# Patient Record
Sex: FEMALE | Race: WHITE | NOT HISPANIC OR LATINO | Employment: OTHER | URBAN - NONMETROPOLITAN AREA
[De-identification: names, ages, dates, MRNs, and addresses within clinical notes are randomized per-mention and may not be internally consistent; named-entity substitution may affect disease eponyms.]

---

## 2018-08-11 PROBLEM — R55 SYNCOPE AND COLLAPSE: Status: ACTIVE | Noted: 2018-08-11

## 2018-08-13 PROBLEM — R63.4 WEIGHT LOSS, UNINTENTIONAL: Status: ACTIVE | Noted: 2018-08-13

## 2018-08-14 PROBLEM — R42 VERTIGO: Status: ACTIVE | Noted: 2018-08-14

## 2022-05-10 DIAGNOSIS — Z12.31 ENCOUNTER FOR SCREENING MAMMOGRAM FOR MALIGNANT NEOPLASM OF BREAST: Primary | ICD-10-CM

## 2022-05-10 DIAGNOSIS — Z78.0 POST-MENOPAUSAL: ICD-10-CM

## 2022-06-29 ENCOUNTER — HOSPITAL ENCOUNTER (EMERGENCY)
Facility: HOSPITAL | Age: 72
Discharge: HOME | End: 2022-06-29
Attending: EMERGENCY MEDICINE
Payer: MEDICARE

## 2022-06-29 VITALS
DIASTOLIC BLOOD PRESSURE: 60 MMHG | WEIGHT: 100 LBS | HEIGHT: 62 IN | BODY MASS INDEX: 18.4 KG/M2 | RESPIRATION RATE: 16 BRPM | TEMPERATURE: 98 F | HEART RATE: 64 BPM | SYSTOLIC BLOOD PRESSURE: 131 MMHG | OXYGEN SATURATION: 99 %

## 2022-06-29 DIAGNOSIS — R42 DIZZINESS: Primary | ICD-10-CM

## 2022-06-29 LAB
ALBUMIN SERPL BCP-MCNC: 3.5 G/DL (ref 3.5–5.2)
ALP SERPL-CCNC: 92 U/L (ref 55–135)
ALT SERPL W/O P-5'-P-CCNC: 10 U/L (ref 10–44)
ANION GAP SERPL CALC-SCNC: 5 MMOL/L (ref 8–16)
AST SERPL-CCNC: 14 U/L (ref 10–40)
BASOPHILS # BLD AUTO: 0.07 K/UL (ref 0–0.2)
BASOPHILS NFR BLD: 1.4 % (ref 0–1.9)
BILIRUB SERPL-MCNC: 0.8 MG/DL (ref 0.1–1)
BUN SERPL-MCNC: 8 MG/DL (ref 8–23)
CALCIUM SERPL-MCNC: 8.6 MG/DL (ref 8.7–10.5)
CHLORIDE SERPL-SCNC: 106 MMOL/L (ref 95–110)
CO2 SERPL-SCNC: 29 MMOL/L (ref 23–29)
CREAT SERPL-MCNC: 0.8 MG/DL (ref 0.5–1.4)
DIFFERENTIAL METHOD: ABNORMAL
EOSINOPHIL # BLD AUTO: 0.1 K/UL (ref 0–0.5)
EOSINOPHIL NFR BLD: 1.2 % (ref 0–8)
ERYTHROCYTE [DISTWIDTH] IN BLOOD BY AUTOMATED COUNT: 13.6 % (ref 11.5–14.5)
EST. GFR  (AFRICAN AMERICAN): >60 ML/MIN/1.73 M^2
EST. GFR  (NON AFRICAN AMERICAN): >60 ML/MIN/1.73 M^2
GLUCOSE SERPL-MCNC: 102 MG/DL (ref 70–110)
HCT VFR BLD AUTO: 45.9 % (ref 37–48.5)
HGB BLD-MCNC: 15.6 G/DL (ref 12–16)
IMM GRANULOCYTES # BLD AUTO: 0.02 K/UL (ref 0–0.04)
IMM GRANULOCYTES NFR BLD AUTO: 0.4 % (ref 0–0.5)
LYMPHOCYTES # BLD AUTO: 2.3 K/UL (ref 1–4.8)
LYMPHOCYTES NFR BLD: 47.6 % (ref 18–48)
MAGNESIUM SERPL-MCNC: 2.5 MG/DL (ref 1.6–2.6)
MCH RBC QN AUTO: 32.2 PG (ref 27–31)
MCHC RBC AUTO-ENTMCNC: 34 G/DL (ref 32–36)
MCV RBC AUTO: 95 FL (ref 82–98)
MONOCYTES # BLD AUTO: 0.4 K/UL (ref 0.3–1)
MONOCYTES NFR BLD: 8.6 % (ref 4–15)
NEUTROPHILS # BLD AUTO: 2 K/UL (ref 1.8–7.7)
NEUTROPHILS NFR BLD: 40.8 % (ref 38–73)
NRBC BLD-RTO: 0 /100 WBC
PLATELET # BLD AUTO: 236 K/UL (ref 150–450)
PMV BLD AUTO: 11 FL (ref 9.2–12.9)
POTASSIUM SERPL-SCNC: 3.8 MMOL/L (ref 3.5–5.1)
PROT SERPL-MCNC: 7 G/DL (ref 6–8.4)
RBC # BLD AUTO: 4.84 M/UL (ref 4–5.4)
SODIUM SERPL-SCNC: 140 MMOL/L (ref 136–145)
WBC # BLD AUTO: 4.9 K/UL (ref 3.9–12.7)

## 2022-06-29 PROCEDURE — 99283 EMERGENCY DEPT VISIT LOW MDM: CPT

## 2022-06-29 PROCEDURE — 80053 COMPREHEN METABOLIC PANEL: CPT | Performed by: EMERGENCY MEDICINE

## 2022-06-29 PROCEDURE — 85025 COMPLETE CBC W/AUTO DIFF WBC: CPT | Performed by: EMERGENCY MEDICINE

## 2022-06-29 PROCEDURE — 83735 ASSAY OF MAGNESIUM: CPT | Performed by: EMERGENCY MEDICINE

## 2022-06-29 RX ORDER — MECLIZINE HYDROCHLORIDE 25 MG/1
25 TABLET ORAL
Status: DISCONTINUED | OUTPATIENT
Start: 2022-06-29 | End: 2022-06-29 | Stop reason: HOSPADM

## 2022-06-29 NOTE — ED PROVIDER NOTES
EMERGENCY DEPARTMENT HISTORY AND PHYSICAL EXAM          Date: 6/29/2022   Patient Name: Pricila Smith       History of Presenting Illness           Chief Complaint   Patient presents with    Dizziness     Pt began feeling dizzy this morning. Pt family took her to another ER. Pt was discharged and Dx with A-fib. Pt was then taken to PCP and had elevated B/P and was instructed to come to the ER.          History Provided By: Patient and Family Member    [id number]   Pricila Smith is a 72 y.o. female with PMHX of hypertension on propanolol, anxiety on Paxil and Xanax as needed, hyperlipidemia who presents to the emergency department C/O dizziness.    Patient reports general malaise x1 week.  Reports feeling dizzy which she describes as room spinning, fatigue, nausea, and numbness and tingling in extremities. Patient was seen at [facility] today for these symptoms. Had a negative head CT, labs, and UA and was discharged. She was noted to be in a-fib.     Patient went to PCP and was told her BP was too high and to go back to ER.    She reports       PCP: [facility]        Current Facility-Administered Medications   Medication Dose Route Frequency Provider Last Rate Last Admin    meclizine tablet 25 mg  25 mg Oral ED 1 Time Eliseo Whitehead MD         Current Outpatient Medications   Medication Sig Dispense Refill    ALPRAZolam (XANAX) 0.25 MG tablet Take 0.25 mg by mouth daily as needed for Anxiety.      meclizine (ANTIVERT) 25 mg tablet Take 1 tablet (25 mg total) by mouth 3 (three) times daily as needed for Dizziness. 60 tablet 0    paroxetine (PAXIL) 20 MG tablet Take 20 mg by mouth every morning.      propranolol (INDERAL LA) 120 MG 24 hr capsule Take 120 mg by mouth once daily.             Past History     Past Medical History:   Past Medical History:   Diagnosis Date    Hypercholesteremia     Hypertension         Past Surgical History:   Past Surgical History:   Procedure Laterality  "Date    HYSTERECTOMY          Family History:   No family history on file.     Social History:   Social History     Tobacco Use    Smoking status: Heavy Tobacco Smoker     Packs/day: 0.50    Smokeless tobacco: Never Used   Substance Use Topics    Alcohol use: No        Allergies:   Review of patient's allergies indicates:   Allergen Reactions    Codeine      Nausea vomiting and diarrhea          Review of Systems   Review of Systems   Constitutional: Positive for activity change and fatigue.   Gastrointestinal: Positive for nausea. Negative for abdominal pain and vomiting.   Neurological: Positive for dizziness and numbness.   All other systems reviewed and are negative.               Physical Exam     Vitals:    06/29/22 1439 06/29/22 1525 06/29/22 1556 06/29/22 1620   BP: (!) 198/74 (!) 184/79 (!) 155/72 131/60   BP Location: Right arm      Patient Position: Sitting      Pulse: 81 67 61 64   Resp: 16 18 18 16   Temp: 97.6 °F (36.4 °C)      TempSrc: Oral      SpO2: 97% 98% 98% 99%   Weight: 45.4 kg (100 lb)      Height: 5' 2" (1.575 m)         Physical Exam  Vitals and nursing note reviewed.   Constitutional:       General: She is not in acute distress.     Appearance: Normal appearance. She is not ill-appearing.   HENT:      Head: Normocephalic and atraumatic.      Right Ear: External ear normal.      Left Ear: External ear normal.      Nose: Nose normal. No congestion or rhinorrhea.      Mouth/Throat:      Mouth: Mucous membranes are moist.   Eyes:      Conjunctiva/sclera: Conjunctivae normal.      Pupils: Pupils are equal, round, and reactive to light.   Cardiovascular:      Rate and Rhythm: Normal rate and regular rhythm.      Pulses: Normal pulses.      Heart sounds: Murmur heard.    Systolic murmur is present with a grade of 2/6.  Pulmonary:      Effort: Pulmonary effort is normal. No respiratory distress.      Breath sounds: Normal breath sounds.   Abdominal:      General: Bowel sounds are normal. There " is no distension.      Palpations: Abdomen is soft.      Tenderness: There is no abdominal tenderness. There is no guarding or rebound.   Musculoskeletal:         General: No deformity. Normal range of motion.      Cervical back: Normal range of motion. No rigidity.   Skin:     General: Skin is dry.   Neurological:      General: No focal deficit present.      Mental Status: She is alert and oriented to person, place, and time. Mental status is at baseline.      Cranial Nerves: Cranial nerves 2-12 are intact.      Sensory: Sensation is intact.      Motor: Motor function is intact. No weakness, tremor or abnormal muscle tone.      Coordination: Coordination is intact. Coordination normal. Finger-Nose-Finger Test normal. Rapid alternating movements normal.   Psychiatric:         Mood and Affect: Mood normal.         Speech: Speech normal.         Behavior: Behavior normal. Behavior is cooperative.              Diagnostic Study Results      Labs -   Recent Results (from the past 12 hour(s))   CBC auto differential    Collection Time: 06/29/22  3:35 PM   Result Value Ref Range    WBC 4.90 3.90 - 12.70 K/uL    RBC 4.84 4.00 - 5.40 M/uL    Hemoglobin 15.6 12.0 - 16.0 g/dL    Hematocrit 45.9 37.0 - 48.5 %    MCV 95 82 - 98 fL    MCH 32.2 (H) 27.0 - 31.0 pg    MCHC 34.0 32.0 - 36.0 g/dL    RDW 13.6 11.5 - 14.5 %    Platelets 236 150 - 450 K/uL    MPV 11.0 9.2 - 12.9 fL    Immature Granulocytes 0.4 0.0 - 0.5 %    Gran # (ANC) 2.0 1.8 - 7.7 K/uL    Immature Grans (Abs) 0.02 0.00 - 0.04 K/uL    Lymph # 2.3 1.0 - 4.8 K/uL    Mono # 0.4 0.3 - 1.0 K/uL    Eos # 0.1 0.0 - 0.5 K/uL    Baso # 0.07 0.00 - 0.20 K/uL    nRBC 0 0 /100 WBC    Gran % 40.8 38.0 - 73.0 %    Lymph % 47.6 18.0 - 48.0 %    Mono % 8.6 4.0 - 15.0 %    Eosinophil % 1.2 0.0 - 8.0 %    Basophil % 1.4 0.0 - 1.9 %    Differential Method Automated    Comprehensive metabolic panel    Collection Time: 06/29/22  3:35 PM   Result Value Ref Range    Sodium 140 136 - 145  mmol/L    Potassium 3.8 3.5 - 5.1 mmol/L    Chloride 106 95 - 110 mmol/L    CO2 29 23 - 29 mmol/L    Glucose 102 70 - 110 mg/dL    BUN 8 8 - 23 mg/dL    Creatinine 0.8 0.5 - 1.4 mg/dL    Calcium 8.6 (L) 8.7 - 10.5 mg/dL    Total Protein 7.0 6.0 - 8.4 g/dL    Albumin 3.5 3.5 - 5.2 g/dL    Total Bilirubin 0.8 0.1 - 1.0 mg/dL    Alkaline Phosphatase 92 55 - 135 U/L    AST 14 10 - 40 U/L    ALT 10 10 - 44 U/L    Anion Gap 5 (L) 8 - 16 mmol/L    eGFR if African American >60.0 >60 mL/min/1.73 m^2    eGFR if non African American >60.0 >60 mL/min/1.73 m^2   Magnesium    Collection Time: 06/29/22  3:35 PM   Result Value Ref Range    Magnesium 2.5 1.6 - 2.6 mg/dL        Radiologic Studies -    No orders to display        Medications given in the ED-   Medications   meclizine tablet 25 mg (25 mg Oral Not Given 6/29/22 1530)           Medical Decision Making    I am the first provider for this patient.     I reviewed the vital signs, available nursing notes, past medical history, past surgical history, family history and social history.     Vital Signs:  Reviewed the patient's vital signs.     Pulse Oximetry Analysis and Interpretation:    98% on Room Air, normal      EKG interpretation: (Preliminary)   Interpreted by Eliseo Whitehead MD at 1538   Appears to be normal sinus rhythm rate of 67 with significant artifacts     EKG interpretation: (Preliminary)   Interpreted by Eliseo Whitehead MD at 1538   Normal sinus rhythm rate of 71, normal intervals, normal axis, normal EKG     Records Reviewed: Old medical records.  Nursing notes.        Provider Notes (Medical Decision Making): Pricila Smith is a 72 y.o. female here with general malaise and dizziness x1 week    Patient hypertensive on arrival with time patient is now normotensive.  Observed on monitor patient remained in normal sinus rhythm.  She had an EKG read as AFib by the computer however I believe this is just picking up artifact.  It is possible patient is having  paroxysmal AFib and I have instructed her to follow up with her cardiologist, Dr. Cabrera.    Her labs are benign.  Electrolytes unremarkable.  She is not having any sort of ACS or anginal symptoms.  Her neuro exam is nonfocal normal.  Doubt central cause of her dizziness or vertigo.  She had a negative head CT outside hospital today.    I have instructed patient to follow-up with her primary care provider for further evaluation of her symptoms.  Reasons to return to ER discussed and patient under strict return precautions for any symptoms of AFib RVR, syncope, chest pain, or shortness of breath.        Procedures:   Procedures      ED Course:               Diagnosis and Disposition     Critical Care:      DISCHARGE NOTE:       Pricila Smith's  results have been reviewed with her.  She has been counseled regarding her diagnosis, treatment, and plan.  She verbally conveys understanding and agreement of the signs, symptoms, diagnosis, treatment and prognosis and additionally agrees to follow up as discussed.  She also agrees with the care-plan and conveys that all of her questions have been answered.  I have also provided discharge instructions for her that include: educational information regarding their diagnosis and treatment, and list of reasons why they would want to return to the ED prior to their follow-up appointment, should her condition change. She has been provided with education for proper emergency department utilization.         CLINICAL IMPRESSION:         1. Dizziness              PLAN:   1. Discharge Home  2.      Medication List      ASK your doctor about these medications    ALPRAZolam 0.25 MG tablet  Commonly known as: XANAX     meclizine 25 mg tablet  Commonly known as: ANTIVERT  Take 1 tablet (25 mg total) by mouth 3 (three) times daily as needed for Dizziness.     paroxetine 20 MG tablet  Commonly known as: PAXIL     propranoloL 120 MG 24 hr capsule  Commonly known as: INDERAL LA           3.  [facility]  [address] [facility] [address]  [phone]    Schedule an appointment as soon as possible for a visit   Primary care follow up    Addi Cabrera MD  [address] [facility] [address]  [facility] [address]  [phone]             _______________________________     Please note that this dictation was completed with [employer], the computer voice recognition software.  Quite often unanticipated grammatical, syntax, homophones, and other interpretive errors are inadvertently transcribed by the computer software.  Please disregard these errors.  Please excuse any errors that have escaped final proofreading.             Eliseo Whitehead MD  06/29/22 8615

## 2023-01-12 ENCOUNTER — OFFICE VISIT (OUTPATIENT)
Dept: PRIMARY CARE CLINIC | Facility: CLINIC | Age: 73
End: 2023-01-12
Payer: MEDICARE

## 2023-01-12 VITALS
OXYGEN SATURATION: 99 % | BODY MASS INDEX: 18.03 KG/M2 | HEART RATE: 68 BPM | WEIGHT: 98 LBS | DIASTOLIC BLOOD PRESSURE: 93 MMHG | SYSTOLIC BLOOD PRESSURE: 209 MMHG | RESPIRATION RATE: 14 BRPM | HEIGHT: 62 IN | TEMPERATURE: 98 F

## 2023-01-12 DIAGNOSIS — R63.4 WEIGHT LOSS, UNINTENTIONAL: ICD-10-CM

## 2023-01-12 DIAGNOSIS — I10 PRIMARY HYPERTENSION: ICD-10-CM

## 2023-01-12 DIAGNOSIS — R22.42 LOCALIZED SWELLING OF LEFT FOOT: ICD-10-CM

## 2023-01-12 DIAGNOSIS — E78.2 MIXED HYPERLIPIDEMIA: Primary | ICD-10-CM

## 2023-01-12 DIAGNOSIS — Z11.59 ENCOUNTER FOR HEPATITIS C SCREENING TEST FOR LOW RISK PATIENT: ICD-10-CM

## 2023-01-12 LAB
ALBUMIN SERPL BCP-MCNC: 3.6 G/DL (ref 3.5–5.2)
ALP SERPL-CCNC: 106 U/L (ref 55–135)
ALT SERPL W/O P-5'-P-CCNC: 13 U/L (ref 10–44)
ANION GAP SERPL CALC-SCNC: 4 MMOL/L (ref 8–16)
AST SERPL-CCNC: 13 U/L (ref 10–40)
BASOPHILS # BLD AUTO: 0.08 K/UL (ref 0–0.2)
BASOPHILS NFR BLD: 1.5 % (ref 0–1.9)
BILIRUB SERPL-MCNC: 0.6 MG/DL (ref 0.1–1)
BUN SERPL-MCNC: 13 MG/DL (ref 8–23)
CALCIUM SERPL-MCNC: 9.3 MG/DL (ref 8.7–10.5)
CHLORIDE SERPL-SCNC: 108 MMOL/L (ref 95–110)
CHOLEST SERPL-MCNC: 191 MG/DL (ref 120–199)
CHOLEST/HDLC SERPL: 3.8 {RATIO} (ref 2–5)
CO2 SERPL-SCNC: 28 MMOL/L (ref 23–29)
CREAT SERPL-MCNC: 0.9 MG/DL (ref 0.5–1.4)
DIFFERENTIAL METHOD: ABNORMAL
EOSINOPHIL # BLD AUTO: 0 K/UL (ref 0–0.5)
EOSINOPHIL NFR BLD: 0.7 % (ref 0–8)
ERYTHROCYTE [DISTWIDTH] IN BLOOD BY AUTOMATED COUNT: 13.1 % (ref 11.5–14.5)
EST. GFR  (NO RACE VARIABLE): >60 ML/MIN/1.73 M^2
GLUCOSE SERPL-MCNC: 102 MG/DL (ref 70–110)
HCT VFR BLD AUTO: 46.6 % (ref 37–48.5)
HDLC SERPL-MCNC: 50 MG/DL (ref 40–75)
HDLC SERPL: 26.2 % (ref 20–50)
HGB BLD-MCNC: 15.8 G/DL (ref 12–16)
IMM GRANULOCYTES # BLD AUTO: 0.01 K/UL (ref 0–0.04)
IMM GRANULOCYTES NFR BLD AUTO: 0.2 % (ref 0–0.5)
LDLC SERPL CALC-MCNC: 126.8 MG/DL (ref 63–159)
LYMPHOCYTES # BLD AUTO: 2.3 K/UL (ref 1–4.8)
LYMPHOCYTES NFR BLD: 43 % (ref 18–48)
MCH RBC QN AUTO: 33.4 PG (ref 27–31)
MCHC RBC AUTO-ENTMCNC: 33.9 G/DL (ref 32–36)
MCV RBC AUTO: 99 FL (ref 82–98)
MONOCYTES # BLD AUTO: 0.5 K/UL (ref 0.3–1)
MONOCYTES NFR BLD: 9.6 % (ref 4–15)
NEUTROPHILS # BLD AUTO: 2.4 K/UL (ref 1.8–7.7)
NEUTROPHILS NFR BLD: 45 % (ref 38–73)
NONHDLC SERPL-MCNC: 141 MG/DL
NRBC BLD-RTO: 0 /100 WBC
PLATELET # BLD AUTO: 246 K/UL (ref 150–450)
PMV BLD AUTO: 12 FL (ref 9.2–12.9)
POTASSIUM SERPL-SCNC: 4.3 MMOL/L (ref 3.5–5.1)
PROT SERPL-MCNC: 7.5 G/DL (ref 6–8.4)
RBC # BLD AUTO: 4.73 M/UL (ref 4–5.4)
SODIUM SERPL-SCNC: 140 MMOL/L (ref 136–145)
TRIGL SERPL-MCNC: 71 MG/DL (ref 30–150)
WBC # BLD AUTO: 5.42 K/UL (ref 3.9–12.7)

## 2023-01-12 PROCEDURE — 80061 LIPID PANEL: CPT | Performed by: STUDENT IN AN ORGANIZED HEALTH CARE EDUCATION/TRAINING PROGRAM

## 2023-01-12 PROCEDURE — 99999 PR PBB SHADOW E&M-EST. PATIENT-LVL IV: ICD-10-PCS | Mod: PBBFAC,,, | Performed by: STUDENT IN AN ORGANIZED HEALTH CARE EDUCATION/TRAINING PROGRAM

## 2023-01-12 PROCEDURE — 85025 COMPLETE CBC W/AUTO DIFF WBC: CPT | Performed by: STUDENT IN AN ORGANIZED HEALTH CARE EDUCATION/TRAINING PROGRAM

## 2023-01-12 PROCEDURE — 99999 PR PBB SHADOW E&M-EST. PATIENT-LVL IV: CPT | Mod: PBBFAC,,, | Performed by: STUDENT IN AN ORGANIZED HEALTH CARE EDUCATION/TRAINING PROGRAM

## 2023-01-12 PROCEDURE — 86803 HEPATITIS C AB TEST: CPT | Performed by: STUDENT IN AN ORGANIZED HEALTH CARE EDUCATION/TRAINING PROGRAM

## 2023-01-12 PROCEDURE — 80053 COMPREHEN METABOLIC PANEL: CPT | Performed by: STUDENT IN AN ORGANIZED HEALTH CARE EDUCATION/TRAINING PROGRAM

## 2023-01-12 PROCEDURE — 99214 OFFICE O/P EST MOD 30 MIN: CPT | Mod: PBBFAC,PN | Performed by: STUDENT IN AN ORGANIZED HEALTH CARE EDUCATION/TRAINING PROGRAM

## 2023-01-12 PROCEDURE — 99203 PR OFFICE/OUTPT VISIT, NEW, LEVL III, 30-44 MIN: ICD-10-PCS | Mod: S$PBB,,, | Performed by: STUDENT IN AN ORGANIZED HEALTH CARE EDUCATION/TRAINING PROGRAM

## 2023-01-12 PROCEDURE — 99203 OFFICE O/P NEW LOW 30 MIN: CPT | Mod: S$PBB,,, | Performed by: STUDENT IN AN ORGANIZED HEALTH CARE EDUCATION/TRAINING PROGRAM

## 2023-01-12 RX ORDER — AMLODIPINE BESYLATE 5 MG/1
5 TABLET ORAL DAILY
Qty: 30 TABLET | Refills: 11 | Status: SHIPPED | OUTPATIENT
Start: 2023-01-12 | End: 2023-01-26

## 2023-01-12 RX ORDER — EZETIMIBE 10 MG/1
10 TABLET ORAL
COMMUNITY
Start: 2022-08-18 | End: 2023-01-12 | Stop reason: SDUPTHER

## 2023-01-12 RX ORDER — ASPIRIN 81 MG/1
81 TABLET ORAL DAILY
COMMUNITY

## 2023-01-12 RX ORDER — EZETIMIBE 10 MG/1
10 TABLET ORAL DAILY
Qty: 90 TABLET | Refills: 0 | Status: SHIPPED | OUTPATIENT
Start: 2023-01-12 | End: 2023-03-24

## 2023-01-12 NOTE — PROGRESS NOTES
[facility] Note    HPI:  Pricila Smith is a 72 y.o. female who presents today for No chief complaint on file.    Denies fever, chills, headaches, vision changes, weight changes, appetite changes, fatigue, chest pain, palpitations, shortness of breath, abdominal pain, nausea, vomiting, constipation, dysuria, diarrhea.     ROS   A review of systems was performed and was negative except as noted above.    I personally reviewed allergies, past medical, surgical, social and family history and updated as appropriate.    Medications:    Current Outpatient Medications:     ALPRAZolam (XANAX) 0.25 MG tablet, Take 0.25 mg by mouth daily as needed for Anxiety., Disp: , Rfl:     aspirin 81 mg Cap, 1 tablet, Disp: , Rfl:     paroxetine (PAXIL) 20 MG tablet, Take 20 mg by mouth every morning., Disp: , Rfl:     propranolol (INDERAL LA) 120 MG 24 hr capsule, Take 120 mg by mouth once daily., Disp: , Rfl:     amLODIPine (NORVASC) 5 MG tablet, Take 1 tablet (5 mg total) by mouth once daily., Disp: 30 tablet, Rfl: 11    ezetimibe (ZETIA) 10 mg tablet, Take 1 tablet (10 mg total) by mouth once daily., Disp: 90 tablet, Rfl: 0     Health Maintenance:    There is no immunization history on file for this patient.   Health Maintenance   Topic Date Due    Hepatitis C Screening  Never done    Lipid Panel  Never done    TETANUS VACCINE  Never done    DEXA Scan  Never done    Mammogram  11/13/2016     The patient has no Health Maintenance topics of status Not Due  Health Maintenance Due   Topic Date Due    Hepatitis C Screening  Never done    Lipid Panel  Never done    TETANUS VACCINE  Never done    DEXA Scan  Never done    Colorectal Cancer Screening  Never done    Mammogram  11/13/2016       PHYSICAL EXAM:  Vitals:    01/12/23 0944 01/12/23 1011 01/12/23 1012   BP: (!) 197/91 (!) 207/96 (!) 209/93   BP Location: Right arm Right arm Left arm   Patient Position: Sitting Sitting    BP Method: Small (Automatic)     Pulse: 68 67  "68   Resp: 14     Temp: 98.1 °F (36.7 °C)     TempSrc: Oral     SpO2: 99%     Weight: 44.5 kg (98 lb)     Height: 5' 2" (1.575 m)       Body mass index is 17.92 kg/m².  Physical Exam  Vitals reviewed.   Constitutional:       General: She is not in acute distress.     Appearance: Normal appearance. She is not ill-appearing or toxic-appearing.      Comments: thin   HENT:      Head: Normocephalic and atraumatic.      Right Ear: External ear normal.      Left Ear: External ear normal.      Nose: Nose normal.      Mouth/Throat:      Mouth: Mucous membranes are moist.      Pharynx: Oropharynx is clear.   Eyes:      Extraocular Movements: Extraocular movements intact.      Conjunctiva/sclera: Conjunctivae normal.   Cardiovascular:      Rate and Rhythm: Normal rate and regular rhythm.      Pulses: Normal pulses.      Heart sounds: Normal heart sounds.   Pulmonary:      Effort: Pulmonary effort is normal. No respiratory distress.      Breath sounds: No stridor. No wheezing, rhonchi or rales.   Abdominal:      General: Abdomen is flat. Bowel sounds are normal. There is no distension.      Palpations: Abdomen is soft.      Tenderness: There is no abdominal tenderness. There is no right CVA tenderness, left CVA tenderness or guarding.   Musculoskeletal:         General: No tenderness. Normal range of motion.      Cervical back: Normal range of motion and neck supple. No rigidity or tenderness.   Skin:     General: Skin is warm and dry.      Capillary Refill: Capillary refill takes less than 2 seconds.      Comments: Left foot dorsal swelling with mild erythema and tenderness   Neurological:      General: No focal deficit present.      Mental Status: She is alert and oriented to person, place, and time. Mental status is at baseline.      Motor: No weakness.      Gait: Gait normal.   Psychiatric:         Mood and Affect: Mood normal.         Behavior: Behavior normal.         Thought Content: Thought content normal.         " Judgment: Judgment normal.        ASSESSMENT/PLAN:  1. Mixed hyperlipidemia  -     Lipid Panel; Future; Expected date: 01/12/2023  -     ezetimibe (ZETIA) 10 mg tablet; Take 1 tablet (10 mg total) by mouth once daily.  Dispense: 90 tablet; Refill: 0    2. Encounter for hepatitis C screening test for low risk patient  -     Hepatitis C Antibody; Future; Expected date: 01/12/2023    3. Primary hypertension  Assessment & Plan:  Elevated SBP >200 mmHg, Patient asymptomatic. Has long been on propranolol 120 mg daily which she takes at bed time. Will add amlodipine 5 mg to regimen. Patient does not have BP cuff at home for keeping BP log.   - f/u 2 weeks for BP check      Orders:  -     Comprehensive Metabolic Panel; Future; Expected date: 01/12/2023  -     CBC Auto Differential; Future; Expected date: 01/12/2023  -     amLODIPine (NORVASC) 5 MG tablet; Take 1 tablet (5 mg total) by mouth once daily.  Dispense: 30 tablet; Refill: 11    4. Weight loss, unintentional  Overview:  Wt Readings from Last 8 Encounters:   01/12/23 44.5 kg (98 lb)   06/29/22 45.4 kg (100 lb)   08/13/18 52.7 kg (116 lb 2.9 oz)   Recommendations:   Stay physically active. As tolerated alternate resistance training with stretching and cardio. Goal of 150 minutes per week of moderate intensity activity or 7,500 - 10,000 steps per day. Follow the Mediterranean Diet. Include whole fresh fruits, vegetables, olive oil, seeds, nuts, whole grains, cold water fish, salmon, mackerel and lean cuts of meat.  Do not drink sugary/diet carbonated beverages. Avoid fast or fried and processed food, especially canned foods. Avoid refined carbohydrates, white starchy foods, flour, white potato, bread, muffins, and cakes. Consider substituting one meal a day with a meal replacement such as Slim fast, lean cuisine, or weight watcher's. Follow a healthy diet that includes enough calcium, vitamin D and proteins for bone health.        Assessment & Plan:  BMI <18. Denies  recent weight fluctuation. Appetite is good. Will continue to monitor weight. Encouraged regular physical activity.       5. Localized swelling of left foot  Assessment & Plan:  Over dorsum of foot at base of big toe. Likely associated from frictional force from tight fitting new shoes.   - stop wearing poor fitting shoes  - soak feet in epson salt  - apply ice over area of swelling  - f/u 2 weeks            Other than changes above, continue current medications and maintain follow up with specialists.      Follow up in about 2 weeks (around 1/26/2023) for BP check, Med recheck, Lab review.   No results found for this or any previous visit (from the past 2016 hour(s)).      Kazumi G Yoshinaga, DO [facility]

## 2023-01-12 NOTE — ASSESSMENT & PLAN NOTE
Per patient, she was once on statin and did not tolerated due to GI symptoms. Currently taking zetia.   No recent lipid panel on file.   - f/u 2 weeks for review of labs

## 2023-01-12 NOTE — ASSESSMENT & PLAN NOTE
BMI <18. Denies recent weight fluctuation. Appetite is good. Will continue to monitor weight. Encouraged regular physical activity.

## 2023-01-12 NOTE — ASSESSMENT & PLAN NOTE
Over dorsum of foot at base of big toe. Likely associated from frictional force from tight fitting new shoes.   - stop wearing poor fitting shoes  - soak feet in epson salt  - apply ice over area of swelling  - f/u 2 weeks

## 2023-01-12 NOTE — ASSESSMENT & PLAN NOTE
Elevated SBP >200 mmHg, Patient asymptomatic. Has long been on propranolol 120 mg daily which she takes at bed time. Will add amlodipine 5 mg to regimen. Patient does not have BP cuff at home for keeping BP log.   - f/u 2 weeks for BP check

## 2023-01-13 LAB — HCV AB SERPL QL IA: NORMAL

## 2023-01-26 ENCOUNTER — OFFICE VISIT (OUTPATIENT)
Dept: PRIMARY CARE CLINIC | Facility: CLINIC | Age: 73
End: 2023-01-26
Payer: MEDICARE

## 2023-01-26 VITALS
WEIGHT: 99 LBS | BODY MASS INDEX: 18.22 KG/M2 | OXYGEN SATURATION: 100 % | HEIGHT: 62 IN | DIASTOLIC BLOOD PRESSURE: 76 MMHG | TEMPERATURE: 98 F | SYSTOLIC BLOOD PRESSURE: 138 MMHG | RESPIRATION RATE: 14 BRPM | HEART RATE: 75 BPM

## 2023-01-26 DIAGNOSIS — E78.00 HYPERCHOLESTEREMIA: Chronic | ICD-10-CM

## 2023-01-26 DIAGNOSIS — I10 HYPERTENSION, UNSPECIFIED TYPE: Primary | Chronic | ICD-10-CM

## 2023-01-26 DIAGNOSIS — R22.42 LOCALIZED SWELLING OF LEFT FOOT: ICD-10-CM

## 2023-01-26 DIAGNOSIS — Z12.11 COLON CANCER SCREENING: ICD-10-CM

## 2023-01-26 DIAGNOSIS — I65.22 STENOSIS OF LEFT CAROTID ARTERY: Chronic | ICD-10-CM

## 2023-01-26 DIAGNOSIS — Z28.21 IMMUNIZATION DECLINED: ICD-10-CM

## 2023-01-26 DIAGNOSIS — F41.9 ANXIETY: Chronic | ICD-10-CM

## 2023-01-26 DIAGNOSIS — R01.1 HEART MURMUR: ICD-10-CM

## 2023-01-26 DIAGNOSIS — F17.210 CONTINUOUS DEPENDENCE ON CIGARETTE SMOKING: Chronic | ICD-10-CM

## 2023-01-26 PROBLEM — R55 SYNCOPE AND COLLAPSE: Status: RESOLVED | Noted: 2018-08-11 | Resolved: 2023-01-26

## 2023-01-26 PROCEDURE — 99999 PR PBB SHADOW E&M-EST. PATIENT-LVL IV: ICD-10-PCS | Mod: PBBFAC,,, | Performed by: STUDENT IN AN ORGANIZED HEALTH CARE EDUCATION/TRAINING PROGRAM

## 2023-01-26 PROCEDURE — 99999 PR PBB SHADOW E&M-EST. PATIENT-LVL IV: CPT | Mod: PBBFAC,,, | Performed by: STUDENT IN AN ORGANIZED HEALTH CARE EDUCATION/TRAINING PROGRAM

## 2023-01-26 PROCEDURE — 99214 OFFICE O/P EST MOD 30 MIN: CPT | Mod: PBBFAC,PN | Performed by: STUDENT IN AN ORGANIZED HEALTH CARE EDUCATION/TRAINING PROGRAM

## 2023-01-26 PROCEDURE — 99214 OFFICE O/P EST MOD 30 MIN: CPT | Mod: S$PBB,,, | Performed by: STUDENT IN AN ORGANIZED HEALTH CARE EDUCATION/TRAINING PROGRAM

## 2023-01-26 PROCEDURE — 99214 PR OFFICE/OUTPT VISIT, EST, LEVL IV, 30-39 MIN: ICD-10-PCS | Mod: S$PBB,,, | Performed by: STUDENT IN AN ORGANIZED HEALTH CARE EDUCATION/TRAINING PROGRAM

## 2023-01-26 NOTE — ASSESSMENT & PLAN NOTE
Daily paxel 20 mg daily. Xanax 0.25 mg PRN. On average taking one tablet daily.   Counseled at length on practicing deep breathing to help relax muscles.  - when mind drifts off, then focus back to breathing  - continue monitoring  - f/u with behavioral health specialist

## 2023-01-26 NOTE — ASSESSMENT & PLAN NOTE
Patient denies symptoms. No recent work up or follow up with cardiovascular specialist.   - f/u referral cardiology

## 2023-01-26 NOTE — ASSESSMENT & PLAN NOTE
Elevated SBP >200 mmHg, on recheck SBP drops to 130-40s mmHg. Patient asymptomatic. Attributes to white coat.   - currently taking daily propranolol 120 mg daily   - Patient does not have BP cuff at home for keeping BP log, but will obtain on in one week and start keeping morning and bedtime blood pressure readings   - f/u 4 weeks

## 2023-01-26 NOTE — PROGRESS NOTES
[facility] Note    HPI:  Pricila Smith is a 72 y.o. female who presents today for Follow-up (2 week blood pressure follow up. Patient states she takes her propanolol at night time. )  72 year old female with hypertension, hypercholesterolemia, carotid artery disease, heavy cigarette smoking presents for follow up lab review.   Foot pain has resolved after soaks and not wearing tight fitting shoes.   Denies fever, chills, vision changes, dizziness, chest pain, shortness of breath, palpitations, abdominal pain, nausea, vomiting, diarrhea, constipation.   Patient states she is working on cutting back on smoking, but need to start her day with coffee and cigarette and cigarette after each meal.     ROS   A review of systems was performed and was negative except as noted above.    I personally reviewed allergies, past medical, surgical, social and family history and updated as appropriate.    Medications:    Current Outpatient Medications:     ALPRAZolam (XANAX) 0.25 MG tablet, Take 0.25 mg by mouth daily as needed for Anxiety., Disp: , Rfl:     aspirin 81 mg Cap, 1 tablet, Disp: , Rfl:     paroxetine (PAXIL) 20 MG tablet, Take 20 mg by mouth every morning., Disp: , Rfl:     propranolol (INDERAL LA) 120 MG 24 hr capsule, Take 120 mg by mouth once daily., Disp: , Rfl:     ezetimibe (ZETIA) 10 mg tablet, Take 1 tablet (10 mg total) by mouth once daily., Disp: 90 tablet, Rfl: 0     Health Maintenance:    There is no immunization history on file for this patient.   Health Maintenance   Topic Date Due    TETANUS VACCINE  Never done    DEXA Scan  Never done    Mammogram  11/13/2016    Lipid Panel  01/12/2028    Hepatitis C Screening  Completed     Health Maintenance Topics with due status: Not Due       Topic Last Completion Date    Lipid Panel 01/12/2023     Health Maintenance Due   Topic Date Due    TETANUS VACCINE  Never done    DEXA Scan  Never done    Colorectal Cancer Screening  Never done    Mammogram   "11/13/2016       PHYSICAL EXAM:  Vitals:    01/26/23 0828 01/26/23 0913   BP: (!) 208/83 138/76   BP Location: Left arm    Patient Position: Sitting    BP Method: Medium (Automatic)    Pulse: 75    Resp: 14    Temp: 98.4 °F (36.9 °C)    TempSrc: Oral    SpO2: 100%    Weight: 44.9 kg (99 lb)    Height: 5' 2" (1.575 m)      Body mass index is 18.11 kg/m².  Physical Exam  Vitals reviewed.   Constitutional:       General: She is not in acute distress.     Appearance: Normal appearance. She is not ill-appearing or toxic-appearing.      Comments: thin   HENT:      Head: Normocephalic and atraumatic.      Right Ear: External ear normal.      Left Ear: External ear normal.      Nose: Nose normal.      Mouth/Throat:      Mouth: Mucous membranes are moist.      Pharynx: Oropharynx is clear.   Eyes:      Extraocular Movements: Extraocular movements intact.      Conjunctiva/sclera: Conjunctivae normal.   Cardiovascular:      Rate and Rhythm: Normal rate and regular rhythm.      Pulses: Normal pulses.      Heart sounds: Normal heart sounds.   Pulmonary:      Effort: Pulmonary effort is normal. No respiratory distress.      Breath sounds: No stridor. No wheezing, rhonchi or rales.   Abdominal:      General: Abdomen is flat. Bowel sounds are normal. There is no distension.      Palpations: Abdomen is soft.      Tenderness: There is no abdominal tenderness. There is no right CVA tenderness, left CVA tenderness or guarding.   Musculoskeletal:         General: No tenderness. Normal range of motion.      Cervical back: Normal range of motion and neck supple. No rigidity or tenderness.   Skin:     General: Skin is warm and dry.      Capillary Refill: Capillary refill takes less than 2 seconds.   Neurological:      General: No focal deficit present.      Mental Status: She is alert and oriented to person, place, and time. Mental status is at baseline.      Motor: No weakness.      Gait: Gait normal.   Psychiatric:         Mood and " Affect: Mood normal.         Behavior: Behavior normal.         Thought Content: Thought content normal.         Judgment: Judgment normal.        ASSESSMENT/PLAN:  1. Hypertension, unspecified type  Assessment & Plan:  Elevated SBP >200 mmHg, on recheck SBP drops to 130-40s mmHg. Patient asymptomatic. Attributes to white coat.   - currently taking daily propranolol 120 mg daily   - Patient does not have BP cuff at home for keeping BP log, but will obtain on in one week and start keeping morning and bedtime blood pressure readings   - f/u 4 weeks    Orders:  -     Ambulatory referral/consult to Cardiology; Future; Expected date: 02/02/2023    2. Heart murmur  Assessment & Plan:  Systolic murmur on exam. Patient denies any work up with cardiology. Asymptomatic.  - f/u referral cardiology       Orders:  -     Ambulatory referral/consult to Cardiology; Future; Expected date: 02/02/2023    3. Hypercholesteremia    4. Anxiety  Assessment & Plan:  Daily paxel 20 mg daily. Xanax 0.25 mg PRN. On average taking one tablet daily.   Counseled at length on practicing deep breathing to help relax muscles.  - when mind drifts off, then focus back to breathing  - continue monitoring  - f/u with behavioral health specialist        5. Stenosis of left carotid artery  Overview:  CTA neck  IMPRESSION:   1. Approximately 40-50% stenosis at the proximal aspect of the left internal carotid artery.  2. Mild atherosclerotic change at the right carotid bulb/proximal right ICA without significant focal stenosis.  3. Widely patent bilateral vertebral arteries.   Electronically signed by: Danie Ackerman MD  Date:                                            08/13/2018    Assessment & Plan:  Patient denies symptoms. No recent work up or follow up with cardiovascular specialist.   - f/u referral cardiology    Orders:  -     Ambulatory referral/consult to Cardiology; Future; Expected date: 02/02/2023    6. Localized swelling of left foot    7. Colon  cancer screening  Assessment & Plan:  Agreeable to stool study at home and cologuard.   - f/u cologuard     Orders:  -     Cologuard Screening (Multitarget Stool DNA); Future; Expected date: 01/26/2023    8. BMI less than 19,adult  Overview:  Wt Readings from Last 8 Encounters:   01/26/23 44.9 kg (99 lb)   01/12/23 44.5 kg (98 lb)   06/29/22 45.4 kg (100 lb)   08/13/18 52.7 kg (116 lb 2.9 oz)       Assessment & Plan:  Continue to monitor weight changes. Encouraged continue healthy food choices. Weight bearing exercises to help with bone health.   For healthy bone building will start daily nutritional supplementation.   - take daily 2000 IU vitamin D3 and calcium 1200 mg daily supplements  - f/u vitamin D levels in 8-10 weeks  - incorporate into diet, vitamin D fortified foods, cereal, yogurt, fresh salmon, canned sardines, tuna and  mushrooms      9. Immunization declined  Assessment & Plan:  Decline influenza, COVID19, shingle, pneumonia vaccines at this time.       10. Continuous dependence on cigarette smoking  Overview:  2-3 pack per day history during early years. Currently has cut back to 1/2 pack per day.       Assessment & Plan:  Encouraged smoking cessation. Not ready to completely stop smoking.           Other than changes above, continue current medications and maintain follow up with specialists.      No follow-ups on file.   Recent Results (from the past 2016 hour(s))   Hepatitis C Antibody    Collection Time: 01/12/23 10:53 AM   Result Value Ref Range    Hepatitis C Ab Non-reactive Non-reactive   Lipid Panel    Collection Time: 01/12/23 10:53 AM   Result Value Ref Range    Cholesterol 191 120 - 199 mg/dL    Triglycerides 71 30 - 150 mg/dL    HDL 50 40 - 75 mg/dL    LDL Cholesterol 126.8 63.0 - 159.0 mg/dL    HDL/Cholesterol Ratio 26.2 20.0 - 50.0 %    Total Cholesterol/HDL Ratio 3.8 2.0 - 5.0    Non-HDL Cholesterol 141 mg/dL   CBC Auto Differential    Collection Time: 01/12/23 10:53 AM   Result Value  Ref Range    WBC 5.42 3.90 - 12.70 K/uL    RBC 4.73 4.00 - 5.40 M/uL    Hemoglobin 15.8 12.0 - 16.0 g/dL    Hematocrit 46.6 37.0 - 48.5 %    MCV 99 (H) 82 - 98 fL    MCH 33.4 (H) 27.0 - 31.0 pg    MCHC 33.9 32.0 - 36.0 g/dL    RDW 13.1 11.5 - 14.5 %    Platelets 246 150 - 450 K/uL    MPV 12.0 9.2 - 12.9 fL    Immature Granulocytes 0.2 0.0 - 0.5 %    Gran # (ANC) 2.4 1.8 - 7.7 K/uL    Immature Grans (Abs) 0.01 0.00 - 0.04 K/uL    Lymph # 2.3 1.0 - 4.8 K/uL    Mono # 0.5 0.3 - 1.0 K/uL    Eos # 0.0 0.0 - 0.5 K/uL    Baso # 0.08 0.00 - 0.20 K/uL    nRBC 0 0 /100 WBC    Gran % 45.0 38.0 - 73.0 %    Lymph % 43.0 18.0 - 48.0 %    Mono % 9.6 4.0 - 15.0 %    Eosinophil % 0.7 0.0 - 8.0 %    Basophil % 1.5 0.0 - 1.9 %    Differential Method Automated    Comprehensive Metabolic Panel    Collection Time: 01/12/23 10:53 AM   Result Value Ref Range    Sodium 140 136 - 145 mmol/L    Potassium 4.3 3.5 - 5.1 mmol/L    Chloride 108 95 - 110 mmol/L    CO2 28 23 - 29 mmol/L    Glucose 102 70 - 110 mg/dL    BUN 13 8 - 23 mg/dL    Creatinine 0.9 0.5 - 1.4 mg/dL    Calcium 9.3 8.7 - 10.5 mg/dL    Total Protein 7.5 6.0 - 8.4 g/dL    Albumin 3.6 3.5 - 5.2 g/dL    Total Bilirubin 0.6 0.1 - 1.0 mg/dL    Alkaline Phosphatase 106 55 - 135 U/L    AST 13 10 - 40 U/L    ALT 13 10 - 44 U/L    Anion Gap 4 (L) 8 - 16 mmol/L    eGFR >60.0 >60 mL/min/1.73 m^2         Nayeli Perez DO  [facility]

## 2023-01-26 NOTE — ASSESSMENT & PLAN NOTE
Continue to monitor weight changes. Encouraged continue healthy food choices. Weight bearing exercises to help with bone health.   For healthy bone building will start daily nutritional supplementation.   - take daily 2000 IU vitamin D3 and calcium 1200 mg daily supplements  - f/u vitamin D levels in 8-10 weeks  - incorporate into diet, vitamin D fortified foods, cereal, yogurt, fresh salmon, canned sardines, tuna and  mushrooms

## 2023-01-26 NOTE — ASSESSMENT & PLAN NOTE
Systolic murmur on exam. Patient denies any work up with cardiology. Asymptomatic.  - f/u referral cardiology

## 2023-02-24 LAB — NONINV COLON CA DNA+OCC BLD SCRN STL QL: POSITIVE

## 2023-02-26 ENCOUNTER — HOSPITAL ENCOUNTER (EMERGENCY)
Facility: HOSPITAL | Age: 73
Discharge: HOME | End: 2023-02-26
Attending: EMERGENCY MEDICINE
Payer: MEDICARE

## 2023-02-26 VITALS
BODY MASS INDEX: 17.88 KG/M2 | HEIGHT: 62 IN | DIASTOLIC BLOOD PRESSURE: 70 MMHG | TEMPERATURE: 98 F | WEIGHT: 97.19 LBS | OXYGEN SATURATION: 99 % | RESPIRATION RATE: 24 BRPM | HEART RATE: 70 BPM | SYSTOLIC BLOOD PRESSURE: 152 MMHG

## 2023-02-26 DIAGNOSIS — R07.9 CHEST PAIN: ICD-10-CM

## 2023-02-26 LAB
ALBUMIN SERPL BCP-MCNC: 3.3 G/DL (ref 3.5–5.2)
ALP SERPL-CCNC: 79 U/L (ref 55–135)
ALT SERPL W/O P-5'-P-CCNC: 11 U/L (ref 10–44)
ANION GAP SERPL CALC-SCNC: 2 MMOL/L (ref 8–16)
AST SERPL-CCNC: 11 U/L (ref 10–40)
BASOPHILS # BLD AUTO: 0.07 K/UL (ref 0–0.2)
BASOPHILS NFR BLD: 1.4 % (ref 0–1.9)
BILIRUB SERPL-MCNC: 0.7 MG/DL (ref 0.1–1)
BUN SERPL-MCNC: 12 MG/DL (ref 8–23)
CALCIUM SERPL-MCNC: 9.3 MG/DL (ref 8.7–10.5)
CHLORIDE SERPL-SCNC: 109 MMOL/L (ref 95–110)
CO2 SERPL-SCNC: 29 MMOL/L (ref 23–29)
CREAT SERPL-MCNC: 0.9 MG/DL (ref 0.5–1.4)
DIFFERENTIAL METHOD: ABNORMAL
EOSINOPHIL # BLD AUTO: 0.1 K/UL (ref 0–0.5)
EOSINOPHIL NFR BLD: 1.4 % (ref 0–8)
ERYTHROCYTE [DISTWIDTH] IN BLOOD BY AUTOMATED COUNT: 13 % (ref 11.5–14.5)
EST. GFR  (NO RACE VARIABLE): >60 ML/MIN/1.73 M^2
GLUCOSE SERPL-MCNC: 107 MG/DL (ref 70–110)
HCT VFR BLD AUTO: 44.8 % (ref 37–48.5)
HGB BLD-MCNC: 15 G/DL (ref 12–16)
IMM GRANULOCYTES # BLD AUTO: 0.01 K/UL (ref 0–0.04)
IMM GRANULOCYTES NFR BLD AUTO: 0.2 % (ref 0–0.5)
LYMPHOCYTES # BLD AUTO: 2.3 K/UL (ref 1–4.8)
LYMPHOCYTES NFR BLD: 46.8 % (ref 18–48)
MCH RBC QN AUTO: 33.6 PG (ref 27–31)
MCHC RBC AUTO-ENTMCNC: 33.5 G/DL (ref 32–36)
MCV RBC AUTO: 100 FL (ref 82–98)
MONOCYTES # BLD AUTO: 0.5 K/UL (ref 0.3–1)
MONOCYTES NFR BLD: 10.1 % (ref 4–15)
NEUTROPHILS # BLD AUTO: 2 K/UL (ref 1.8–7.7)
NEUTROPHILS NFR BLD: 40.1 % (ref 38–73)
NRBC BLD-RTO: 0 /100 WBC
PLATELET # BLD AUTO: 210 K/UL (ref 150–450)
PMV BLD AUTO: 11.5 FL (ref 9.2–12.9)
POTASSIUM SERPL-SCNC: 4.7 MMOL/L (ref 3.5–5.1)
PROT SERPL-MCNC: 6.3 G/DL (ref 6–8.4)
RBC # BLD AUTO: 4.46 M/UL (ref 4–5.4)
SODIUM SERPL-SCNC: 140 MMOL/L (ref 136–145)
TROPONIN I SERPL DL<=0.01 NG/ML-MCNC: 10.2 PG/ML (ref 0–60)
WBC # BLD AUTO: 4.96 K/UL (ref 3.9–12.7)

## 2023-02-26 PROCEDURE — 93010 ELECTROCARDIOGRAM REPORT: CPT | Mod: ,,, | Performed by: INTERNAL MEDICINE

## 2023-02-26 PROCEDURE — 36415 COLL VENOUS BLD VENIPUNCTURE: CPT | Performed by: EMERGENCY MEDICINE

## 2023-02-26 PROCEDURE — 93010 EKG 12-LEAD: ICD-10-PCS | Mod: ,,, | Performed by: INTERNAL MEDICINE

## 2023-02-26 PROCEDURE — 99285 EMERGENCY DEPT VISIT HI MDM: CPT | Mod: 25

## 2023-02-26 PROCEDURE — 80053 COMPREHEN METABOLIC PANEL: CPT | Performed by: EMERGENCY MEDICINE

## 2023-02-26 PROCEDURE — 85025 COMPLETE CBC W/AUTO DIFF WBC: CPT | Performed by: EMERGENCY MEDICINE

## 2023-02-26 PROCEDURE — 84484 ASSAY OF TROPONIN QUANT: CPT | Performed by: EMERGENCY MEDICINE

## 2023-02-26 PROCEDURE — 93005 ELECTROCARDIOGRAM TRACING: CPT

## 2023-02-26 PROCEDURE — 25000003 PHARM REV CODE 250: Performed by: EMERGENCY MEDICINE

## 2023-02-26 RX ORDER — ALPRAZOLAM 0.25 MG/1
0.25 TABLET ORAL
Status: COMPLETED | OUTPATIENT
Start: 2023-02-26 | End: 2023-02-26

## 2023-02-26 RX ADMIN — ALPRAZOLAM 0.25 MG: 0.25 TABLET ORAL at 11:02

## 2023-02-26 NOTE — ED PROVIDER NOTES
Encounter Date: 2/26/2023       History     Chief Complaint   Patient presents with    Chest Pain     EMS reports pt from [facility]me with c/o palpable CP, SOB and right sided tingling last night. Dr Verdugo 100% Carotid blockage side ??     71 yo female with history as below here via POV with complaint of CP associated with dyspnea and right sided tingling onset last night. Overall improved this AM, but concerned about recently discovered carotid stenosis. Patient followed by [facility], work-up ongoing. No weakness. No aphasia. No fever. No known sick contacts. Similar previous episodes. No aggravating or alleviating factors.     Review of patient's allergies indicates:   Allergen Reactions    Codeine      Nausea vomiting and diarrhea     Past Medical History:   Diagnosis Date    Anxiety     Hypercholesteremia     Hypertension     Localized swelling of left foot     Panic disorder     Syncope and collapse 8/11/2018    Vertigo      Past Surgical History:   Procedure Laterality Date    HYSTERECTOMY       Family History   Problem Relation Age of Onset    Heart disease Father     Cancer Son      Social History     Tobacco Use    Smoking status: Every Day     Packs/day: 0.50     Types: Cigarettes    Smokeless tobacco: Never   Substance Use Topics    Alcohol use: No    Drug use: Never     Review of Systems   Constitutional: Negative.    Respiratory:  Positive for shortness of breath.    Cardiovascular:  Positive for chest pain.   Psychiatric/Behavioral:  The patient is nervous/anxious.    All other systems reviewed and are negative.    Physical Exam     Initial Vitals   BP Pulse Resp Temp SpO2   02/26/23 1026 02/26/23 1026 02/26/23 1028 02/26/23 1029 02/26/23 1026   (!) 209/94 69 18 97.9 °F (36.6 °C) 97 %      MAP       --                Physical Exam    Nursing note and vitals reviewed.  Constitutional: She is not diaphoretic. No distress.   HENT:   Head: Normocephalic and atraumatic.   Eyes: EOM are normal. Pupils are equal, round,  and reactive to light.   Neck: Neck supple.   Normal range of motion.  Cardiovascular:  Normal rate, regular rhythm and intact distal pulses.           Pulmonary/Chest: Breath sounds normal. No respiratory distress. She has no wheezes. She has no rales.   Abdominal: Abdomen is soft. Bowel sounds are normal. She exhibits no distension. There is no abdominal tenderness. There is no rebound.   Musculoskeletal:         General: No tenderness or edema. Normal range of motion.      Cervical back: Normal range of motion and neck supple.     Neurological: She is alert and oriented to person, place, and time.   Skin: Skin is warm and dry.   Psychiatric: Thought content normal. Her mood appears anxious.       ED Course   Procedures  Labs Reviewed   CBC W/ AUTO DIFFERENTIAL - Abnormal; Notable for the following components:       Result Value     (*)     MCH 33.6 (*)     All other components within normal limits   COMPREHENSIVE METABOLIC PANEL - Abnormal; Notable for the following components:    Albumin 3.3 (*)     Anion Gap 2 (*)     All other components within normal limits   TROPONIN I HIGH SENSITIVITY     EKG Readings: (Independently Interpreted)   Initial Reading: No STEMI. Rhythm: Normal Sinus Rhythm. Heart Rate: 72. Ectopy: No Ectopy. Clinical Impression: Normal Sinus Rhythm     Imaging Results              X-Ray Chest AP Portable (Final result)  Result time 02/26/23 12:04:19      Final result by Armen Lees MD (02/26/23 12:04:19)                   Impression:      No acute findings.      Electronically signed by: Armen Lees MD  Date:    02/26/2023  Time:    12:04               Narrative:    EXAMINATION:  XR CHEST AP PORTABLE    CLINICAL HISTORY:  Chest pain, unspecified    COMPARISON:  Chest x-ray 08/11/2018.    FINDINGS:  Unremarkable AP cardiac silhouette.  No focal airspace disease.  No pleural fluid.                                    X-Rays:   Independently Interpreted Readings:   Chest X-Ray: No  acute abnormalities.   Medications   ALPRAZolam tablet 0.25 mg (0.25 mg Oral Given 2/26/23 1130)     Medical Decision Making:   Clinical Tests:   Lab Tests: Ordered and Reviewed  Radiological Study: Ordered and Reviewed  Medical Tests: Reviewed and Ordered                        Clinical Impression:   Final diagnoses:  [R07.9] Chest pain        ED Disposition Condition    Discharge Stable          ED Prescriptions    None       Follow-up Information       Follow up With Specialties Details Why Contact Info    [facility] Psychology, Internal Medicine, Gynecology, Dental General Practice Schedule an appointment as soon as possible for a visit   [address] [place] [id number]  [phone]      Yousuf Verdugo MD Cardiology Schedule an appointment as soon as possible for a visit   [address]   [facility] [id number]  [phone]               Jacob Way MD  02/26/23 7126

## 2023-03-06 ENCOUNTER — LAB VISIT (OUTPATIENT)
Dept: LAB | Facility: HOSPITAL | Age: 73
End: 2023-03-06
Attending: NURSE PRACTITIONER
Payer: MEDICARE

## 2023-03-06 DIAGNOSIS — E87.5 HYPERPOTASSEMIA: Primary | ICD-10-CM

## 2023-03-06 LAB — POTASSIUM SERPL-SCNC: 4.3 MMOL/L (ref 3.5–5.1)

## 2023-03-06 PROCEDURE — 84132 ASSAY OF SERUM POTASSIUM: CPT | Performed by: NURSE PRACTITIONER

## 2023-03-06 PROCEDURE — 36415 COLL VENOUS BLD VENIPUNCTURE: CPT | Performed by: NURSE PRACTITIONER

## 2023-03-23 ENCOUNTER — OFFICE VISIT (OUTPATIENT)
Dept: PRIMARY CARE CLINIC | Facility: CLINIC | Age: 73
End: 2023-03-23
Payer: MEDICARE

## 2023-03-23 VITALS
HEIGHT: 62 IN | RESPIRATION RATE: 15 BRPM | OXYGEN SATURATION: 100 % | SYSTOLIC BLOOD PRESSURE: 244 MMHG | WEIGHT: 100 LBS | TEMPERATURE: 98 F | HEART RATE: 68 BPM | BODY MASS INDEX: 18.4 KG/M2 | DIASTOLIC BLOOD PRESSURE: 108 MMHG

## 2023-03-23 DIAGNOSIS — F17.210 CONTINUOUS DEPENDENCE ON CIGARETTE SMOKING: Chronic | ICD-10-CM

## 2023-03-23 DIAGNOSIS — Z28.21 IMMUNIZATION DECLINED: ICD-10-CM

## 2023-03-23 DIAGNOSIS — F17.200 NEEDS SMOKING CESSATION EDUCATION: ICD-10-CM

## 2023-03-23 DIAGNOSIS — R19.5 POSITIVE COLORECTAL CANCER SCREENING USING COLOGUARD TEST: Primary | ICD-10-CM

## 2023-03-23 DIAGNOSIS — E78.00 HYPERCHOLESTEREMIA: Chronic | ICD-10-CM

## 2023-03-23 DIAGNOSIS — I10 PRIMARY HYPERTENSION: Chronic | ICD-10-CM

## 2023-03-23 PROCEDURE — 99999 PR PBB SHADOW E&M-EST. PATIENT-LVL V: ICD-10-PCS | Mod: PBBFAC,,, | Performed by: STUDENT IN AN ORGANIZED HEALTH CARE EDUCATION/TRAINING PROGRAM

## 2023-03-23 PROCEDURE — 99215 OFFICE O/P EST HI 40 MIN: CPT | Mod: PBBFAC,PN | Performed by: STUDENT IN AN ORGANIZED HEALTH CARE EDUCATION/TRAINING PROGRAM

## 2023-03-23 PROCEDURE — 99213 PR OFFICE/OUTPT VISIT, EST, LEVL III, 20-29 MIN: ICD-10-PCS | Mod: S$PBB,,, | Performed by: STUDENT IN AN ORGANIZED HEALTH CARE EDUCATION/TRAINING PROGRAM

## 2023-03-23 PROCEDURE — 99213 OFFICE O/P EST LOW 20 MIN: CPT | Mod: S$PBB,,, | Performed by: STUDENT IN AN ORGANIZED HEALTH CARE EDUCATION/TRAINING PROGRAM

## 2023-03-23 PROCEDURE — 99999 PR PBB SHADOW E&M-EST. PATIENT-LVL V: CPT | Mod: PBBFAC,,, | Performed by: STUDENT IN AN ORGANIZED HEALTH CARE EDUCATION/TRAINING PROGRAM

## 2023-03-23 RX ORDER — OLMESARTAN MEDOXOMIL 40 MG/1
40 TABLET ORAL DAILY
COMMUNITY
Start: 2023-02-23

## 2023-03-23 RX ORDER — ROSUVASTATIN CALCIUM 20 MG/1
20 TABLET, COATED ORAL NIGHTLY
COMMUNITY
Start: 2023-02-23

## 2023-03-23 RX ORDER — AMLODIPINE BESYLATE 5 MG/1
5 TABLET ORAL
COMMUNITY
Start: 2023-02-23 | End: 2023-04-10

## 2023-03-24 PROBLEM — R19.5 POSITIVE COLORECTAL CANCER SCREENING USING COLOGUARD TEST: Status: ACTIVE | Noted: 2023-03-24

## 2023-03-24 NOTE — PROGRESS NOTES
[facility] Note    HPI:  Pricila Smith is a 72 y.o. female who presents today for Follow-up (Follow up to discuss cologuard results)  72 year old female with hypertension, hypercholesterolemia, carotid stenosis, heavy cigarette smoking, anxiety endorses having stopped taking her medications due to running out of medications and not refilling them as scheduled.    Explained to patient on positive findings of home send off test with Cologuard.   Patient denies any changes in bowel habits, stool color and caliber. Denies abdominal distension, mass, blood in urine or stools. Denies fever, chills, excessive headaches, vision changes other than losing acuity and needing eye glasses.     ROS   A review of systems was performed and was negative except as noted above.    I personally reviewed allergies, past medical, surgical, social and family history and updated as appropriate.    Medications:    Current Outpatient Medications:     ALPRAZolam (XANAX) 0.25 MG tablet, Take 0.25 mg by mouth daily as needed for Anxiety., Disp: , Rfl:     amLODIPine (NORVASC) 5 MG tablet, Take 5 mg by mouth., Disp: , Rfl:     aspirin 81 mg Cap, 1 tablet, Disp: , Rfl:     olmesartan (BENICAR) 40 MG tablet, Take 40 mg by mouth., Disp: , Rfl:     paroxetine (PAXIL) 20 MG tablet, Take 20 mg by mouth every morning., Disp: , Rfl:     propranolol (INDERAL LA) 120 MG 24 hr capsule, Take 120 mg by mouth once daily., Disp: , Rfl:     rosuvastatin (CRESTOR) 20 MG tablet, Take 20 mg by mouth., Disp: , Rfl:      Health Maintenance:    There is no immunization history on file for this patient.   Health Maintenance   Topic Date Due    TETANUS VACCINE  Never done    DEXA Scan  Never done    Mammogram  11/13/2016    High Dose Statin  03/24/2024    Lipid Panel  01/12/2028    Hepatitis C Screening  Completed     Health Maintenance Topics with due status: Not Due       Topic Last Completion Date    Lipid Panel 01/12/2023    Colorectal Cancer  "Screening 02/17/2023    High Dose Statin 03/24/2023     Health Maintenance Due   Topic Date Due    TETANUS VACCINE  Never done    DEXA Scan  Never done    Mammogram  11/13/2016     PHYSICAL EXAM:  Vitals:    03/23/23 1335 03/23/23 1353   BP: (!) 234/104 (!) 244/108   BP Location: Right arm    Patient Position: Sitting    BP Method: Small (Automatic)    Pulse: 67 68   Resp: 15    Temp: 97.8 °F (36.6 °C)    TempSrc: Oral    SpO2: 100%    Weight: 45.4 kg (100 lb)    Height: 5' 2" (1.575 m)      Body mass index is 18.29 kg/m².  Physical Exam  Vitals reviewed.   Constitutional:       General: She is not in acute distress.     Appearance: Normal appearance. She is not ill-appearing or toxic-appearing.      Comments: thin   HENT:      Head: Normocephalic and atraumatic.      Right Ear: External ear normal.      Left Ear: External ear normal.      Nose: Nose normal.      Mouth/Throat:      Mouth: Mucous membranes are moist.      Pharynx: Oropharynx is clear.   Eyes:      Extraocular Movements: Extraocular movements intact.      Conjunctiva/sclera: Conjunctivae normal.   Cardiovascular:      Rate and Rhythm: Normal rate and regular rhythm.      Pulses: Normal pulses.      Heart sounds: Normal heart sounds.   Pulmonary:      Effort: Pulmonary effort is normal. No respiratory distress.      Breath sounds: No stridor. No wheezing, rhonchi or rales.   Abdominal:      General: Abdomen is flat. Bowel sounds are normal. There is no distension.      Palpations: Abdomen is soft.      Tenderness: There is no abdominal tenderness. There is no right CVA tenderness, left CVA tenderness or guarding.   Musculoskeletal:         General: No tenderness. Normal range of motion.      Cervical back: Normal range of motion and neck supple. No rigidity or tenderness.   Skin:     General: Skin is warm and dry.      Capillary Refill: Capillary refill takes less than 2 seconds.   Neurological:      General: No focal deficit present.      Mental " Status: She is alert and oriented to person, place, and time. Mental status is at baseline.      Motor: No weakness.      Gait: Gait normal.   Psychiatric:         Mood and Affect: Mood normal.         Behavior: Behavior normal.         Thought Content: Thought content normal.         Judgment: Judgment normal.        ASSESSMENT/PLAN:  1. Positive colorectal cancer screening using Cologuard test  Assessment & Plan:  Patient agreeable to meeting with General Surgeon to schedule follow up colonoscopy.    Orders:  -     Ambulatory referral/consult to General Surgery; Future; Expected date: 03/31/2023    2. Primary hypertension  Assessment & Plan:  Elevated SBP >200 mmHg. Patient asymptomatic, non compliant with vitamins.  - currently taking daily propranolol 120 mg daily   - Patient does not have BP cuff at home for keeping BP log, but will obtain on in one week and start keeping morning and bedtime blood pressure readings   - consider enrollment to digital medicine        3. Hypercholesteremia  Assessment & Plan:  Per patient, she was once on statin and did not tolerated due to GI symptoms. Currently taking zetia.   No recent lipid panel on file.       4. Continuous dependence on cigarette smoking  Overview:  2-3 pack per day history during early years. Currently has cut back to 1/2 pack per day.       Assessment & Plan:  Encouraged smoking cessation. Still not ready to completely stop smoking.       5. BMI less than 19,adult  Overview:  Wt Readings from Last 8 Encounters:   03/23/23 45.4 kg (100 lb)   02/26/23 44.1 kg (97 lb 3.2 oz)   01/26/23 44.9 kg (99 lb)   01/12/23 44.5 kg (98 lb)   06/29/22 45.4 kg (100 lb)   08/13/18 52.7 kg (116 lb 2.9 oz)       Assessment & Plan:  Stable weight. Continue to incorporate healthier food and regular physical exercise.       6. Immunization declined        Other than changes above, continue current medications and maintain follow up with specialists.      No follow-ups on file.    Recent Results (from the past 2016 hour(s))   Hepatitis C Antibody    Collection Time: 01/12/23 10:53 AM   Result Value Ref Range    Hepatitis C Ab Non-reactive Non-reactive   Lipid Panel    Collection Time: 01/12/23 10:53 AM   Result Value Ref Range    Cholesterol 191 120 - 199 mg/dL    Triglycerides 71 30 - 150 mg/dL    HDL 50 40 - 75 mg/dL    LDL Cholesterol 126.8 63.0 - 159.0 mg/dL    HDL/Cholesterol Ratio 26.2 20.0 - 50.0 %    Total Cholesterol/HDL Ratio 3.8 2.0 - 5.0    Non-HDL Cholesterol 141 mg/dL   CBC Auto Differential    Collection Time: 01/12/23 10:53 AM   Result Value Ref Range    WBC 5.42 3.90 - 12.70 K/uL    RBC 4.73 4.00 - 5.40 M/uL    Hemoglobin 15.8 12.0 - 16.0 g/dL    Hematocrit 46.6 37.0 - 48.5 %    MCV 99 (H) 82 - 98 fL    MCH 33.4 (H) 27.0 - 31.0 pg    MCHC 33.9 32.0 - 36.0 g/dL    RDW 13.1 11.5 - 14.5 %    Platelets 246 150 - 450 K/uL    MPV 12.0 9.2 - 12.9 fL    Immature Granulocytes 0.2 0.0 - 0.5 %    Gran # (ANC) 2.4 1.8 - 7.7 K/uL    Immature Grans (Abs) 0.01 0.00 - 0.04 K/uL    Lymph # 2.3 1.0 - 4.8 K/uL    Mono # 0.5 0.3 - 1.0 K/uL    Eos # 0.0 0.0 - 0.5 K/uL    Baso # 0.08 0.00 - 0.20 K/uL    nRBC 0 0 /100 WBC    Gran % 45.0 38.0 - 73.0 %    Lymph % 43.0 18.0 - 48.0 %    Mono % 9.6 4.0 - 15.0 %    Eosinophil % 0.7 0.0 - 8.0 %    Basophil % 1.5 0.0 - 1.9 %    Differential Method Automated    Comprehensive Metabolic Panel    Collection Time: 01/12/23 10:53 AM   Result Value Ref Range    Sodium 140 136 - 145 mmol/L    Potassium 4.3 3.5 - 5.1 mmol/L    Chloride 108 95 - 110 mmol/L    CO2 28 23 - 29 mmol/L    Glucose 102 70 - 110 mg/dL    BUN 13 8 - 23 mg/dL    Creatinine 0.9 0.5 - 1.4 mg/dL    Calcium 9.3 8.7 - 10.5 mg/dL    Total Protein 7.5 6.0 - 8.4 g/dL    Albumin 3.6 3.5 - 5.2 g/dL    Total Bilirubin 0.6 0.1 - 1.0 mg/dL    Alkaline Phosphatase 106 55 - 135 U/L    AST 13 10 - 40 U/L    ALT 13 10 - 44 U/L    Anion Gap 4 (L) 8 - 16 mmol/L    eGFR >60.0 >60 mL/min/1.73 m^2   Gabriel  Screening (Multitarget Stool DNA)    Collection Time: 02/17/23  7:38 AM    Specimen: Stool   Result Value Ref Range    Cologuard Result Positive (A) Negative   CBC auto differential    Collection Time: 02/26/23 11:25 AM   Result Value Ref Range    WBC 4.96 3.90 - 12.70 K/uL    RBC 4.46 4.00 - 5.40 M/uL    Hemoglobin 15.0 12.0 - 16.0 g/dL    Hematocrit 44.8 37.0 - 48.5 %     (H) 82 - 98 fL    MCH 33.6 (H) 27.0 - 31.0 pg    MCHC 33.5 32.0 - 36.0 g/dL    RDW 13.0 11.5 - 14.5 %    Platelets 210 150 - 450 K/uL    MPV 11.5 9.2 - 12.9 fL    Immature Granulocytes 0.2 0.0 - 0.5 %    Gran # (ANC) 2.0 1.8 - 7.7 K/uL    Immature Grans (Abs) 0.01 0.00 - 0.04 K/uL    Lymph # 2.3 1.0 - 4.8 K/uL    Mono # 0.5 0.3 - 1.0 K/uL    Eos # 0.1 0.0 - 0.5 K/uL    Baso # 0.07 0.00 - 0.20 K/uL    nRBC 0 0 /100 WBC    Gran % 40.1 38.0 - 73.0 %    Lymph % 46.8 18.0 - 48.0 %    Mono % 10.1 4.0 - 15.0 %    Eosinophil % 1.4 0.0 - 8.0 %    Basophil % 1.4 0.0 - 1.9 %    Differential Method Automated    Comprehensive metabolic panel    Collection Time: 02/26/23 11:25 AM   Result Value Ref Range    Sodium 140 136 - 145 mmol/L    Potassium 4.7 3.5 - 5.1 mmol/L    Chloride 109 95 - 110 mmol/L    CO2 29 23 - 29 mmol/L    Glucose 107 70 - 110 mg/dL    BUN 12 8 - 23 mg/dL    Creatinine 0.9 0.5 - 1.4 mg/dL    Calcium 9.3 8.7 - 10.5 mg/dL    Total Protein 6.3 6.0 - 8.4 g/dL    Albumin 3.3 (L) 3.5 - 5.2 g/dL    Total Bilirubin 0.7 0.1 - 1.0 mg/dL    Alkaline Phosphatase 79 55 - 135 U/L    AST 11 10 - 40 U/L    ALT 11 10 - 44 U/L    Anion Gap 2 (L) 8 - 16 mmol/L    eGFR >60.0 >60 mL/min/1.73 m^2   TROPONIN I HIGH SENSITIVITY    Collection Time: 02/26/23 11:25 AM   Result Value Ref Range    Troponin I High Sensitivity 10.2 0.0 - 60.0 pg/mL   Potassium    Collection Time: 03/06/23  1:43 PM   Result Value Ref Range    Potassium 4.3 3.5 - 5.1 mmol/L     Nayeli Perez DO  [facility]

## 2023-03-25 NOTE — ASSESSMENT & PLAN NOTE
Per patient, she was once on statin and did not tolerated due to GI symptoms. Currently taking zetia.   No recent lipid panel on file.

## 2023-03-25 NOTE — ASSESSMENT & PLAN NOTE
Elevated SBP >200 mmHg. Patient asymptomatic, non compliant with vitamins.  - currently taking daily propranolol 120 mg daily   - Patient does not have BP cuff at home for keeping BP log, but will obtain on in one week and start keeping morning and bedtime blood pressure readings   - consider enrollment to digital medicine

## 2023-04-03 ENCOUNTER — PATIENT OUTREACH (OUTPATIENT)
Dept: ADMINISTRATIVE | Facility: HOSPITAL | Age: 73
End: 2023-04-03
Payer: MEDICAID

## 2023-04-03 ENCOUNTER — PATIENT MESSAGE (OUTPATIENT)
Dept: ADMINISTRATIVE | Facility: HOSPITAL | Age: 73
End: 2023-04-03
Payer: MEDICAID

## 2023-04-10 ENCOUNTER — PATIENT OUTREACH (OUTPATIENT)
Dept: ADMINISTRATIVE | Facility: HOSPITAL | Age: 73
End: 2023-04-10
Payer: MEDICAID

## 2023-04-25 ENCOUNTER — PATIENT OUTREACH (OUTPATIENT)
Dept: ADMINISTRATIVE | Facility: HOSPITAL | Age: 73
End: 2023-04-25
Payer: MEDICARE

## 2023-04-25 DIAGNOSIS — N95.9 POST MENOPAUSAL PROBLEMS: ICD-10-CM

## 2023-04-25 DIAGNOSIS — Z12.31 ENCOUNTER FOR SCREENING MAMMOGRAM FOR BREAST CANCER: Primary | ICD-10-CM

## 2023-05-22 ENCOUNTER — PATIENT OUTREACH (OUTPATIENT)
Dept: ADMINISTRATIVE | Facility: HOSPITAL | Age: 73
End: 2023-05-22
Payer: MEDICARE

## 2023-05-26 PROBLEM — R26.9 ALTERATION IN MOBILITY DUE TO ACUTE CEREBROVASCULAR ACCIDENT (CVA): Status: ACTIVE | Noted: 2023-05-26

## 2023-05-26 PROBLEM — I63.9 ALTERATION IN MOBILITY DUE TO ACUTE CEREBROVASCULAR ACCIDENT (CVA): Status: ACTIVE | Noted: 2023-05-26

## 2023-05-31 ENCOUNTER — PATIENT OUTREACH (OUTPATIENT)
Dept: ADMINISTRATIVE | Facility: HOSPITAL | Age: 73
End: 2023-05-31
Payer: MEDICARE

## 2023-06-26 ENCOUNTER — DOCUMENT SCAN (OUTPATIENT)
Dept: HOME HEALTH SERVICES | Facility: HOSPITAL | Age: 73
End: 2023-06-26
Payer: MEDICARE

## 2023-07-18 NOTE — ADDENDUM NOTE
Addended by: YARELY ZARATE on: 7/18/2023 08:59 AM     Modules accepted: Orders, Level of Service

## 2023-08-28 PROBLEM — I63.9 ALTERATION IN MOBILITY DUE TO ACUTE CEREBROVASCULAR ACCIDENT (CVA): Status: RESOLVED | Noted: 2023-05-26 | Resolved: 2023-08-28

## 2023-08-28 PROBLEM — R26.9 ALTERATION IN MOBILITY DUE TO ACUTE CEREBROVASCULAR ACCIDENT (CVA): Status: RESOLVED | Noted: 2023-05-26 | Resolved: 2023-08-28

## 2023-12-14 PROBLEM — R29.818 TRANSIENT NEUROLOGICAL SYMPTOMS: Status: ACTIVE | Noted: 2023-12-14

## 2023-12-14 PROBLEM — E87.5 HYPERKALEMIA: Status: ACTIVE | Noted: 2023-12-14

## 2023-12-14 PROBLEM — Z71.89 ACP (ADVANCE CARE PLANNING): Status: ACTIVE | Noted: 2023-12-14
